# Patient Record
Sex: FEMALE | Race: WHITE
[De-identification: names, ages, dates, MRNs, and addresses within clinical notes are randomized per-mention and may not be internally consistent; named-entity substitution may affect disease eponyms.]

---

## 2018-04-08 ENCOUNTER — HEALTH MAINTENANCE LETTER (OUTPATIENT)
Age: 38
End: 2018-04-08

## 2018-07-05 ENCOUNTER — RADIANT APPOINTMENT (OUTPATIENT)
Dept: GENERAL RADIOLOGY | Facility: CLINIC | Age: 38
End: 2018-07-05
Attending: PHYSICIAN ASSISTANT
Payer: COMMERCIAL

## 2018-07-05 ENCOUNTER — OFFICE VISIT (OUTPATIENT)
Dept: FAMILY MEDICINE | Facility: CLINIC | Age: 38
End: 2018-07-05
Payer: COMMERCIAL

## 2018-07-05 VITALS
WEIGHT: 181.2 LBS | SYSTOLIC BLOOD PRESSURE: 125 MMHG | HEART RATE: 86 BPM | RESPIRATION RATE: 16 BRPM | OXYGEN SATURATION: 100 % | TEMPERATURE: 98 F | DIASTOLIC BLOOD PRESSURE: 68 MMHG | BODY MASS INDEX: 29.25 KG/M2

## 2018-07-05 DIAGNOSIS — M25.561 ACUTE PAIN OF RIGHT KNEE: Primary | ICD-10-CM

## 2018-07-05 PROCEDURE — 99213 OFFICE O/P EST LOW 20 MIN: CPT | Performed by: PHYSICIAN ASSISTANT

## 2018-07-05 PROCEDURE — 73562 X-RAY EXAM OF KNEE 3: CPT | Mod: RT

## 2018-07-05 ASSESSMENT — PAIN SCALES - GENERAL: PAINLEVEL: NO PAIN (0)

## 2018-07-05 NOTE — PROGRESS NOTES
"SUBJECTIVE:   Tameka Whitehead is a 38 year old female seen here today for her bilateral Knee pain - right bothers her more  What happened:  - new mehdi at work and feet stick to floor more than she was used to and slightly twisted knee. Sore since. Still able to do her activities.   She is self employed.       Onset: 1 month or so     Description:   Character: aching, sore, crackling     Intensity: moderate - off and on     Progression of Symptoms: same    Increase pain with going up stairs.     occ painful \"cracking\"     No locking.     Accompanying Signs & Symptoms:  Other symptoms: none   History:   Previous similar pain: Left knee hurt years ago       Precipitating factors:   Trauma or overuse: YES    Alleviating factors:  Improved by: none       Therapies Tried and outcome: oils       PFSH:  Past Medical, social, family histories, medications, and allergies were reviewed and updated.     OBJECTIVE:  /68  Pulse 86  Temp 98  F (36.7  C) (Oral)  Resp 16  Wt 181 lb 3.2 oz (82.2 kg)  SpO2 100%  BMI 29.25 kg/m2  General:  Tameka is awake, alert, and cooperative.  NAD.  Right Knee Exam: Inspection: No effusion  Tender: lateral patellar facet, patella tendon  Non-tender: quadriceps insertion, MCL, LCL, lateral joint line, medial joint line, popliteal region, pes anserine bursa  Active Range of Motion: all normal  Special tests: normal Valgus stress test, normal Varus, negative Lachman's test, negative Iram's , no apprehension with lateral stress of the patella    Also examined: hip full range of motion     X-ray right knee: neg. pending radiology     ASSESSMENT:     ICD-10-CM    1. Acute pain of right knee M25.561 XR Knee Right 3 Views     [facility] REFERRAL       PLAN:   ice or cold packs 20 minutes every 2-3 hrs as needed to relieve pain and swelling, for the first 2 days. Then can apply heat 20 minutes every 2-3 hrs (avoid sleeping on heating pad) there after as " needed.   If you can tolerate, start non-steroidal anti-inflammatory medication like: Ibuprofen 600-800 mg three times daily or Aleve 2 tablets of over the counter strength twice a day as needed.   Tylenol can help with pain also.    Active range of motion exercises encouraged  Activity modification trying to avoid activities that cause you pain.   PHYSICAL THERAPY   Follow up 4 wks as needed     Kenroy Coker PA-C

## 2018-07-05 NOTE — NURSING NOTE
"Chief Complaint   Patient presents with     Musculoskeletal Problem     Health Maintenance     PHQ2, HIV, Pap       Initial /68  Pulse 86  Temp 98  F (36.7  C) (Oral)  Resp 16  Wt 181 lb 3.2 oz (82.2 kg)  SpO2 100%  BMI 29.25 kg/m2 Estimated body mass index is 29.25 kg/(m^2) as calculated from the following:    Height as of 4/21/15: 5' 6\" (1.676 m).    Weight as of this encounter: 181 lb 3.2 oz (82.2 kg).  Medication Reconciliation: complete  Anay Vargas CMA    "

## 2018-07-05 NOTE — MR AVS SNAPSHOT
After Visit Summary   7/5/2018    Tameka Whitehead    MRN: [id number]           Patient Information     Date Of Birth          1980        Visit Information        Provider Department      7/5/2018 10:40 AM Kenroy Coker PA-C [facility]        Today's Diagnoses     Acute pain of right knee    -  1       Follow-ups after your visit        Additional Services     [facility], [facility] REFERRAL       **This order will print in the [facility] Scheduling Office**    Physical Therapy, Hand Therapy and Chiropractic Care are available through:    *[facility]  *[facility]  *[facility]    Call one number to schedule at any of the above locations: [phone].    Your provider has referred you to: Physical Therapy at [facility] or [facility]    Indication/Reason for Referral: knee pain  Onset of Illness: 4 wks  Therapy Orders: Evaluate and Treat  Special Programs: None  Special Request: None    Ramone Pack      Additional Comments for the Therapist or Chiropractor:     Please be aware that coverage of these services is subject to the terms and limitations of your health insurance plan.  Call member services at your health plan with any benefit or coverage questions.      Please bring the following to your appointment:    *Your personal calendar for scheduling future appointments  *Comfortable clothing                  Who to contact     If you have questions or need follow up information about today's clinic visit or your schedule please contact [facility] directly at [phone].  Normal or non-critical lab and imaging results will be communicated to you by MyChart, letter or phone within 4 business days after the clinic has received the results. If you do not hear from us within 7 days, please contact the clinic through MyChart or phone. If you have a critical or abnormal lab result, we will notify you by phone as soon as  possible.  Submit refill requests through [facility] or call your pharmacy and they will forward the refill request to us. Please allow 3 business days for your refill to be completed.          Additional Information About Your Visit        [facility] Information     [facility] gives you secure access to your electronic health record. If you see a primary care provider, you can also send messages to your care team and make appointments. If you have questions, please call your primary care clinic.  If you do not have a primary care provider, please call [phone] and they will assist you.        Care EveryWhere ID     This is your Care EveryWhere ID. This could be used by other organizations to access your [facility] medical records  [id number]        Your Vitals Were     Pulse Temperature Respirations Pulse Oximetry BMI (Body Mass Index)       86 98  F (36.7  C) (Oral) 16 100% 29.25 kg/m2        Blood Pressure from Last 3 Encounters:   07/05/18 125/68   04/21/15 122/70   10/07/14 116/69    Weight from Last 3 Encounters:   07/05/18 181 lb 3.2 oz (82.2 kg)   04/21/15 174 lb (78.9 kg)   10/07/14 170 lb (77.1 kg)              We Performed the Following     [facility], [facility]CT[facility] REFERRAL     XR Knee Right 3 Views        Primary Care Provider Office Phone # Fax #    [facility] [phone]       No address on file        Equal Access to Services     ASHA LEWIS : Hadii mily saunderso Sofelipe, waaxda luqadaha, qaybta kaalmada adelarry, chandana castro . So [facility] [phone].    ATENCIÓN: Si habla español, tiene a best disposición servicios gratuitos de asistencia lingüística. Llame al [phone].    We comply with applicable federal civil rights laws and [place] laws. We do not discriminate on the basis of race, color, national origin, age, disability, sex, sexual orientation, or gender identity.            Thank you!     Thank you for choosing [facility]   for your care. Our goal is always to provide you with excellent care. Hearing back from our patients is one way we can continue to improve our services. Please take a few minutes to complete the written survey that you may receive in the mail after your visit with us. Thank you!             Your Updated Medication List - Protect others around you: Learn how to safely use, store and throw away your medicines at www.disposemymeds.org.          This list is accurate as of 7/5/18 11:27 AM.  Always use your most recent med list.                   Brand Name Dispense Instructions for use Diagnosis    desogestrel-ethinyl estradiol 0.15-30 MG-MCG per tablet    APRI    3 Package    Take 1 tablet by mouth daily    Contraception

## 2018-07-17 ENCOUNTER — TELEPHONE (OUTPATIENT)
Dept: FAMILY MEDICINE | Facility: CLINIC | Age: 38
End: 2018-07-17

## 2018-07-17 DIAGNOSIS — M25.561 ACUTE PAIN OF RIGHT KNEE: Primary | ICD-10-CM

## 2018-07-17 NOTE — TELEPHONE ENCOUNTER
Reason for Call: Request for an order or referral: referral     Order or referral being requested: physical therapy     Date needed: as soon as possible    Has the patient been seen by the PCP for this problem? YES    Additional comments: patient would like physical therapy order faxed to [facility]-phone number [phone], patient did not have fax number.     Phone number Patient can be reached at:  Cell number on file:    Telephone Information:   Mobile [phone]       Best Time:  Anytime     Can we leave a detailed message on this number?  YES    Call taken on 7/17/2018 at 10:33 AM by Miesha Fernandez

## 2018-07-17 NOTE — TELEPHONE ENCOUNTER
Kenroy Coker PA-C please advise on request for physical therapy to be done at [facility] instead of [facility].  Original referral for knee pain was 7/5.  I have pended new referral.     Please forward to team for : to fax and notify patient when done.  eDmi Chun RN

## 2020-02-24 ENCOUNTER — HEALTH MAINTENANCE LETTER (OUTPATIENT)
Age: 40
End: 2020-02-24

## 2020-12-13 ENCOUNTER — HEALTH MAINTENANCE LETTER (OUTPATIENT)
Age: 40
End: 2020-12-13

## 2021-04-17 ENCOUNTER — HEALTH MAINTENANCE LETTER (OUTPATIENT)
Age: 41
End: 2021-04-17

## 2021-09-26 ENCOUNTER — HEALTH MAINTENANCE LETTER (OUTPATIENT)
Age: 41
End: 2021-09-26

## 2022-05-08 ENCOUNTER — HEALTH MAINTENANCE LETTER (OUTPATIENT)
Age: 42
End: 2022-05-08

## 2023-04-23 ENCOUNTER — HEALTH MAINTENANCE LETTER (OUTPATIENT)
Age: 43
End: 2023-04-23

## 2023-06-02 ENCOUNTER — HEALTH MAINTENANCE LETTER (OUTPATIENT)
Age: 43
End: 2023-06-02

## 2024-02-11 ENCOUNTER — HEALTH MAINTENANCE LETTER (OUTPATIENT)
Age: 44
End: 2024-02-11